# Patient Record
Sex: MALE | Race: BLACK OR AFRICAN AMERICAN | HISPANIC OR LATINO | Employment: FULL TIME | URBAN - METROPOLITAN AREA
[De-identification: names, ages, dates, MRNs, and addresses within clinical notes are randomized per-mention and may not be internally consistent; named-entity substitution may affect disease eponyms.]

---

## 2019-03-16 ENCOUNTER — APPOINTMENT (EMERGENCY)
Dept: CT IMAGING | Facility: HOSPITAL | Age: 34
End: 2019-03-16
Payer: COMMERCIAL

## 2019-03-16 ENCOUNTER — HOSPITAL ENCOUNTER (OUTPATIENT)
Facility: HOSPITAL | Age: 34
Setting detail: OUTPATIENT SURGERY
Discharge: HOME | End: 2019-03-17
Attending: EMERGENCY MEDICINE | Admitting: SPECIALIST
Payer: COMMERCIAL

## 2019-03-16 DIAGNOSIS — K35.80 APPENDICITIS, ACUTE: Primary | ICD-10-CM

## 2019-03-16 DIAGNOSIS — R11.2 NAUSEA AND VOMITING, INTRACTABILITY OF VOMITING NOT SPECIFIED, UNSPECIFIED VOMITING TYPE: ICD-10-CM

## 2019-03-16 DIAGNOSIS — R10.31 RIGHT LOWER QUADRANT ABDOMINAL PAIN: ICD-10-CM

## 2019-03-16 DIAGNOSIS — R10.32 LEFT LOWER QUADRANT PAIN: ICD-10-CM

## 2019-03-16 LAB
ALBUMIN SERPL BCP-MCNC: 4.9 G/DL (ref 3–5.2)
ALP SERPL-CCNC: 117 U/L (ref 43–122)
ALT SERPL W P-5'-P-CCNC: 66 U/L (ref 9–52)
ANION GAP SERPL CALCULATED.3IONS-SCNC: 12 MMOL/L (ref 5–14)
AST SERPL W P-5'-P-CCNC: 49 U/L (ref 17–59)
BACTERIA UR QL AUTO: ABNORMAL /HPF
BASOPHILS # BLD AUTO: 0 THOUSANDS/ΜL (ref 0–0.1)
BASOPHILS NFR BLD AUTO: 0 % (ref 0–1)
BILIRUB SERPL-MCNC: 0.8 MG/DL
BILIRUB UR QL STRIP: ABNORMAL
BUN SERPL-MCNC: 15 MG/DL (ref 5–25)
CALCIUM SERPL-MCNC: 10.1 MG/DL (ref 8.4–10.2)
CHLORIDE SERPL-SCNC: 101 MMOL/L (ref 97–108)
CLARITY UR: CLEAR
CO2 SERPL-SCNC: 25 MMOL/L (ref 22–30)
COLOR UR: ABNORMAL
CREAT SERPL-MCNC: 0.89 MG/DL (ref 0.7–1.5)
EOSINOPHIL # BLD AUTO: 0 THOUSAND/ΜL (ref 0–0.4)
EOSINOPHIL NFR BLD AUTO: 0 % (ref 0–6)
ERYTHROCYTE [DISTWIDTH] IN BLOOD BY AUTOMATED COUNT: 14.3 %
FLUAV + FLUBV RNA ISLT NAA+PROBE: NOT DETECTED
FLUAV + FLUBV RNA ISLT NAA+PROBE: NOT DETECTED
GFR SERPL CREATININE-BSD FRML MDRD: 131 ML/MIN/1.73SQ M
GLUCOSE SERPL-MCNC: 85 MG/DL (ref 70–99)
GLUCOSE UR STRIP-MCNC: NEGATIVE MG/DL
HCT VFR BLD AUTO: 57.4 % (ref 41–53)
HGB BLD-MCNC: 19 G/DL (ref 13.5–17.5)
HGB UR QL STRIP.AUTO: 50
KETONES UR STRIP-MCNC: NEGATIVE MG/DL
LEUKOCYTE ESTERASE UR QL STRIP: NEGATIVE
LIPASE SERPL-CCNC: 131 U/L (ref 23–300)
LYMPHOCYTES # BLD AUTO: 0.5 THOUSANDS/ΜL (ref 0.5–4)
LYMPHOCYTES NFR BLD AUTO: 5 % (ref 25–45)
MCH RBC QN AUTO: 29.7 PG (ref 26–34)
MCHC RBC AUTO-ENTMCNC: 33.2 G/DL (ref 31–36)
MCV RBC AUTO: 90 FL (ref 80–100)
MONOCYTES # BLD AUTO: 0.5 THOUSAND/ΜL (ref 0.2–0.9)
MONOCYTES NFR BLD AUTO: 5 % (ref 1–10)
MUCOUS THREADS UR QL AUTO: ABNORMAL
NEUTROPHILS # BLD AUTO: 8 THOUSANDS/ΜL (ref 1.8–7.8)
NEUTS SEG NFR BLD AUTO: 89 % (ref 45–65)
NITRITE UR QL STRIP: NEGATIVE
NON-SQ EPI CELLS URNS QL MICRO: ABNORMAL /HPF
PH UR STRIP.AUTO: 5 [PH]
PLATELET # BLD AUTO: 328 THOUSANDS/UL (ref 150–450)
PMV BLD AUTO: 8.1 FL (ref 8.9–12.7)
POTASSIUM SERPL-SCNC: 4.2 MMOL/L (ref 3.6–5)
PROT SERPL-MCNC: 8.4 G/DL (ref 5.9–8.4)
PROT UR STRIP-MCNC: NEGATIVE MG/DL
RBC # BLD AUTO: 6.41 MILLION/UL (ref 4.5–5.9)
RBC #/AREA URNS AUTO: ABNORMAL /HPF
SODIUM SERPL-SCNC: 138 MMOL/L (ref 137–147)
SP GR UR STRIP.AUTO: 1.03 (ref 1–1.04)
UROBILINOGEN UA: NEGATIVE MG/DL
WBC # BLD AUTO: 8.9 THOUSAND/UL (ref 4.5–11)
WBC #/AREA URNS AUTO: ABNORMAL /HPF

## 2019-03-16 PROCEDURE — 99285 EMERGENCY DEPT VISIT HI MDM: CPT

## 2019-03-16 PROCEDURE — 96375 TX/PRO/DX INJ NEW DRUG ADDON: CPT

## 2019-03-16 PROCEDURE — 96361 HYDRATE IV INFUSION ADD-ON: CPT

## 2019-03-16 PROCEDURE — 96365 THER/PROPH/DIAG IV INF INIT: CPT

## 2019-03-16 PROCEDURE — 87502 INFLUENZA DNA AMP PROBE: CPT | Performed by: PHYSICIAN ASSISTANT

## 2019-03-16 PROCEDURE — 74177 CT ABD & PELVIS W/CONTRAST: CPT

## 2019-03-16 PROCEDURE — 81001 URINALYSIS AUTO W/SCOPE: CPT | Performed by: PHYSICIAN ASSISTANT

## 2019-03-16 PROCEDURE — 81003 URINALYSIS AUTO W/O SCOPE: CPT | Performed by: PHYSICIAN ASSISTANT

## 2019-03-16 PROCEDURE — 80053 COMPREHEN METABOLIC PANEL: CPT | Performed by: PHYSICIAN ASSISTANT

## 2019-03-16 PROCEDURE — 83690 ASSAY OF LIPASE: CPT | Performed by: PHYSICIAN ASSISTANT

## 2019-03-16 PROCEDURE — 85025 COMPLETE CBC W/AUTO DIFF WBC: CPT | Performed by: PHYSICIAN ASSISTANT

## 2019-03-16 PROCEDURE — 96367 TX/PROPH/DG ADDL SEQ IV INF: CPT

## 2019-03-16 PROCEDURE — 36415 COLL VENOUS BLD VENIPUNCTURE: CPT | Performed by: PHYSICIAN ASSISTANT

## 2019-03-16 RX ORDER — ONDANSETRON 4 MG/1
4 TABLET, ORALLY DISINTEGRATING ORAL ONCE
Status: COMPLETED | OUTPATIENT
Start: 2019-03-16 | End: 2019-03-16

## 2019-03-16 RX ORDER — EMTRICITABINE AND TENOFOVIR DISOPROXIL FUMARATE 200; 300 MG/1; MG/1
1 TABLET, FILM COATED ORAL DAILY
COMMUNITY

## 2019-03-16 RX ORDER — IBUPROFEN 600 MG/1
600 TABLET ORAL ONCE
Status: COMPLETED | OUTPATIENT
Start: 2019-03-16 | End: 2019-03-16

## 2019-03-16 RX ORDER — LEVOFLOXACIN 5 MG/ML
750 INJECTION, SOLUTION INTRAVENOUS ONCE
Status: COMPLETED | OUTPATIENT
Start: 2019-03-16 | End: 2019-03-17

## 2019-03-16 RX ORDER — ONDANSETRON 2 MG/ML
4 INJECTION INTRAMUSCULAR; INTRAVENOUS ONCE
Status: COMPLETED | OUTPATIENT
Start: 2019-03-16 | End: 2019-03-16

## 2019-03-16 RX ORDER — DICYCLOMINE HCL 20 MG
20 TABLET ORAL ONCE
Status: COMPLETED | OUTPATIENT
Start: 2019-03-16 | End: 2019-03-16

## 2019-03-16 RX ADMIN — SODIUM CHLORIDE 1000 ML: 9 INJECTION, SOLUTION INTRAVENOUS at 19:35

## 2019-03-16 RX ADMIN — IOHEXOL 50 ML: 240 INJECTION, SOLUTION INTRATHECAL; INTRAVASCULAR; INTRAVENOUS; ORAL at 21:44

## 2019-03-16 RX ADMIN — METRONIDAZOLE 500 MG: 500 INJECTION, SOLUTION INTRAVENOUS at 21:04

## 2019-03-16 RX ADMIN — IOHEXOL 100 ML: 350 INJECTION, SOLUTION INTRAVENOUS at 23:37

## 2019-03-16 RX ADMIN — ONDANSETRON 4 MG: 4 TABLET, ORALLY DISINTEGRATING ORAL at 18:31

## 2019-03-16 RX ADMIN — DICYCLOMINE HYDROCHLORIDE 20 MG: 20 TABLET ORAL at 18:48

## 2019-03-16 RX ADMIN — ONDANSETRON 4 MG: 2 INJECTION, SOLUTION INTRAMUSCULAR; INTRAVENOUS at 19:38

## 2019-03-16 RX ADMIN — LEVOFLOXACIN 750 MG: 750 INJECTION, SOLUTION INTRAVENOUS at 23:07

## 2019-03-16 RX ADMIN — IBUPROFEN 600 MG: 600 TABLET ORAL at 18:48

## 2019-03-16 RX ADMIN — SODIUM CHLORIDE 1000 ML: 9 INJECTION, SOLUTION INTRAVENOUS at 21:06

## 2019-03-17 ENCOUNTER — ANESTHESIA EVENT (OUTPATIENT)
Dept: MEDSURG UNIT | Facility: HOSPITAL | Age: 34
End: 2019-03-17

## 2019-03-17 ENCOUNTER — ANESTHESIA (INPATIENT)
Dept: PERIOP | Facility: HOSPITAL | Age: 34
End: 2019-03-17
Payer: COMMERCIAL

## 2019-03-17 ENCOUNTER — ANESTHESIA EVENT (INPATIENT)
Dept: PERIOP | Facility: HOSPITAL | Age: 34
End: 2019-03-17
Payer: COMMERCIAL

## 2019-03-17 ENCOUNTER — ANESTHESIA (OUTPATIENT)
Dept: MEDSURG UNIT | Facility: HOSPITAL | Age: 34
End: 2019-03-17

## 2019-03-17 VITALS
OXYGEN SATURATION: 95 % | HEART RATE: 99 BPM | BODY MASS INDEX: 36.14 KG/M2 | RESPIRATION RATE: 18 BRPM | TEMPERATURE: 98.4 F | HEIGHT: 66 IN | DIASTOLIC BLOOD PRESSURE: 77 MMHG | WEIGHT: 224.87 LBS | SYSTOLIC BLOOD PRESSURE: 148 MMHG

## 2019-03-17 PROBLEM — K35.80 APPENDICITIS, ACUTE: Status: ACTIVE | Noted: 2019-03-16

## 2019-03-17 PROCEDURE — 88304 TISSUE EXAM BY PATHOLOGIST: CPT | Performed by: PATHOLOGY

## 2019-03-17 PROCEDURE — 99219 PR INITIAL OBSERVATION CARE/DAY 50 MINUTES: CPT | Performed by: SPECIALIST

## 2019-03-17 PROCEDURE — 44970 LAPAROSCOPY APPENDECTOMY: CPT | Performed by: SPECIALIST

## 2019-03-17 RX ORDER — PROPOFOL 10 MG/ML
INJECTION, EMULSION INTRAVENOUS AS NEEDED
Status: DISCONTINUED | OUTPATIENT
Start: 2019-03-17 | End: 2019-03-17 | Stop reason: SURG

## 2019-03-17 RX ORDER — HEPARIN SODIUM 5000 [USP'U]/ML
5000 INJECTION, SOLUTION INTRAVENOUS; SUBCUTANEOUS EVERY 8 HOURS SCHEDULED
Status: DISCONTINUED | OUTPATIENT
Start: 2019-03-17 | End: 2019-03-17 | Stop reason: HOSPADM

## 2019-03-17 RX ORDER — SODIUM CHLORIDE, SODIUM LACTATE, POTASSIUM CHLORIDE, CALCIUM CHLORIDE 600; 310; 30; 20 MG/100ML; MG/100ML; MG/100ML; MG/100ML
75 INJECTION, SOLUTION INTRAVENOUS CONTINUOUS
Status: DISCONTINUED | OUTPATIENT
Start: 2019-03-17 | End: 2019-03-17 | Stop reason: HOSPADM

## 2019-03-17 RX ORDER — FENTANYL CITRATE 50 UG/ML
INJECTION, SOLUTION INTRAMUSCULAR; INTRAVENOUS AS NEEDED
Status: DISCONTINUED | OUTPATIENT
Start: 2019-03-17 | End: 2019-03-17 | Stop reason: SURG

## 2019-03-17 RX ORDER — ACETAMINOPHEN 325 MG/1
975 TABLET ORAL ONCE
Status: COMPLETED | OUTPATIENT
Start: 2019-03-17 | End: 2019-03-17

## 2019-03-17 RX ORDER — OXYCODONE HYDROCHLORIDE AND ACETAMINOPHEN 5; 325 MG/1; MG/1
1 TABLET ORAL EVERY 4 HOURS PRN
Status: DISCONTINUED | OUTPATIENT
Start: 2019-03-17 | End: 2019-03-17 | Stop reason: HOSPADM

## 2019-03-17 RX ORDER — KETOROLAC TROMETHAMINE 30 MG/ML
INJECTION, SOLUTION INTRAMUSCULAR; INTRAVENOUS AS NEEDED
Status: DISCONTINUED | OUTPATIENT
Start: 2019-03-17 | End: 2019-03-17 | Stop reason: SURG

## 2019-03-17 RX ORDER — LIDOCAINE HYDROCHLORIDE 10 MG/ML
INJECTION, SOLUTION INFILTRATION; PERINEURAL AS NEEDED
Status: DISCONTINUED | OUTPATIENT
Start: 2019-03-17 | End: 2019-03-17 | Stop reason: SURG

## 2019-03-17 RX ORDER — HEPARIN SODIUM 5000 [USP'U]/ML
INJECTION, SOLUTION INTRAVENOUS; SUBCUTANEOUS AS NEEDED
Status: DISCONTINUED | OUTPATIENT
Start: 2019-03-17 | End: 2019-03-17 | Stop reason: SURG

## 2019-03-17 RX ORDER — OXYCODONE HYDROCHLORIDE AND ACETAMINOPHEN 5; 325 MG/1; MG/1
2 TABLET ORAL EVERY 4 HOURS PRN
Status: DISCONTINUED | OUTPATIENT
Start: 2019-03-17 | End: 2019-03-17 | Stop reason: HOSPADM

## 2019-03-17 RX ORDER — SODIUM CHLORIDE, SODIUM LACTATE, POTASSIUM CHLORIDE, CALCIUM CHLORIDE 600; 310; 30; 20 MG/100ML; MG/100ML; MG/100ML; MG/100ML
INJECTION, SOLUTION INTRAVENOUS CONTINUOUS PRN
Status: DISCONTINUED | OUTPATIENT
Start: 2019-03-17 | End: 2019-03-17 | Stop reason: SURG

## 2019-03-17 RX ORDER — SODIUM CHLORIDE 9 MG/ML
125 INJECTION, SOLUTION INTRAVENOUS CONTINUOUS
Status: DISCONTINUED | OUTPATIENT
Start: 2019-03-17 | End: 2019-03-17 | Stop reason: HOSPADM

## 2019-03-17 RX ORDER — HYDROMORPHONE HCL/PF 1 MG/ML
0.2 SYRINGE (ML) INJECTION
Status: DISCONTINUED | OUTPATIENT
Start: 2019-03-17 | End: 2019-03-17 | Stop reason: HOSPADM

## 2019-03-17 RX ORDER — CEFAZOLIN SODIUM 2 G/50ML
SOLUTION INTRAVENOUS AS NEEDED
Status: DISCONTINUED | OUTPATIENT
Start: 2019-03-17 | End: 2019-03-17 | Stop reason: SURG

## 2019-03-17 RX ORDER — OXYCODONE HYDROCHLORIDE AND ACETAMINOPHEN 5; 325 MG/1; MG/1
1 TABLET ORAL EVERY 4 HOURS PRN
Qty: 20 TABLET | Refills: 0 | Status: SHIPPED | OUTPATIENT
Start: 2019-03-17 | End: 2019-03-27

## 2019-03-17 RX ORDER — ONDANSETRON 2 MG/ML
4 INJECTION INTRAMUSCULAR; INTRAVENOUS ONCE AS NEEDED
Status: DISCONTINUED | OUTPATIENT
Start: 2019-03-17 | End: 2019-03-17 | Stop reason: HOSPADM

## 2019-03-17 RX ORDER — MIDAZOLAM HYDROCHLORIDE 1 MG/ML
INJECTION INTRAMUSCULAR; INTRAVENOUS AS NEEDED
Status: DISCONTINUED | OUTPATIENT
Start: 2019-03-17 | End: 2019-03-17 | Stop reason: SURG

## 2019-03-17 RX ORDER — DEXAMETHASONE SODIUM PHOSPHATE 4 MG/ML
INJECTION, SOLUTION INTRA-ARTICULAR; INTRALESIONAL; INTRAMUSCULAR; INTRAVENOUS; SOFT TISSUE AS NEEDED
Status: DISCONTINUED | OUTPATIENT
Start: 2019-03-17 | End: 2019-03-17 | Stop reason: SURG

## 2019-03-17 RX ORDER — MORPHINE SULFATE 10 MG/ML
10 INJECTION, SOLUTION INTRAMUSCULAR; INTRAVENOUS ONCE
Status: DISCONTINUED | OUTPATIENT
Start: 2019-03-17 | End: 2019-03-17 | Stop reason: HOSPADM

## 2019-03-17 RX ORDER — BUPIVACAINE HYDROCHLORIDE 5 MG/ML
INJECTION, SOLUTION PERINEURAL AS NEEDED
Status: DISCONTINUED | OUTPATIENT
Start: 2019-03-17 | End: 2019-03-17 | Stop reason: HOSPADM

## 2019-03-17 RX ORDER — ROCURONIUM BROMIDE 10 MG/ML
INJECTION, SOLUTION INTRAVENOUS AS NEEDED
Status: DISCONTINUED | OUTPATIENT
Start: 2019-03-17 | End: 2019-03-17 | Stop reason: SURG

## 2019-03-17 RX ORDER — DIPHENHYDRAMINE HYDROCHLORIDE 50 MG/ML
50 INJECTION INTRAMUSCULAR; INTRAVENOUS ONCE
Status: DISCONTINUED | OUTPATIENT
Start: 2019-03-17 | End: 2019-03-17 | Stop reason: HOSPADM

## 2019-03-17 RX ORDER — SODIUM CHLORIDE 9 MG/ML
INJECTION, SOLUTION INTRAVENOUS AS NEEDED
Status: DISCONTINUED | OUTPATIENT
Start: 2019-03-17 | End: 2019-03-17 | Stop reason: HOSPADM

## 2019-03-17 RX ORDER — ONDANSETRON 2 MG/ML
INJECTION INTRAMUSCULAR; INTRAVENOUS AS NEEDED
Status: DISCONTINUED | OUTPATIENT
Start: 2019-03-17 | End: 2019-03-17 | Stop reason: SURG

## 2019-03-17 RX ORDER — ONDANSETRON 2 MG/ML
4 INJECTION INTRAMUSCULAR; INTRAVENOUS EVERY 8 HOURS PRN
Status: DISCONTINUED | OUTPATIENT
Start: 2019-03-17 | End: 2019-03-17 | Stop reason: HOSPADM

## 2019-03-17 RX ADMIN — LIDOCAINE HYDROCHLORIDE 60 MG: 10 INJECTION, SOLUTION INFILTRATION; PERINEURAL at 10:34

## 2019-03-17 RX ADMIN — SODIUM CHLORIDE, SODIUM LACTATE, POTASSIUM CHLORIDE, AND CALCIUM CHLORIDE: .6; .31; .03; .02 INJECTION, SOLUTION INTRAVENOUS at 10:29

## 2019-03-17 RX ADMIN — ONDANSETRON HYDROCHLORIDE 4 MG: 2 INJECTION, SOLUTION INTRAMUSCULAR; INTRAVENOUS at 11:27

## 2019-03-17 RX ADMIN — ROCURONIUM BROMIDE 40 MG: 10 INJECTION INTRAVENOUS at 10:34

## 2019-03-17 RX ADMIN — KETOROLAC TROMETHAMINE 30 MG: 30 INJECTION, SOLUTION INTRAMUSCULAR; INTRAVENOUS at 11:48

## 2019-03-17 RX ADMIN — DEXAMETHASONE SODIUM PHOSPHATE 4 MG: 4 INJECTION, SOLUTION INTRA-ARTICULAR; INTRALESIONAL; INTRAMUSCULAR; INTRAVENOUS; SOFT TISSUE at 11:25

## 2019-03-17 RX ADMIN — HYDROMORPHONE HYDROCHLORIDE 0.2 MG: 1 INJECTION, SOLUTION INTRAMUSCULAR; INTRAVENOUS; SUBCUTANEOUS at 12:38

## 2019-03-17 RX ADMIN — HEPARIN SODIUM 5000 UNITS: 5000 INJECTION INTRAVENOUS; SUBCUTANEOUS at 14:00

## 2019-03-17 RX ADMIN — SUGAMMADEX 200 MG: 100 INJECTION, SOLUTION INTRAVENOUS at 12:01

## 2019-03-17 RX ADMIN — CEFAZOLIN SODIUM 2000 MG: 2 SOLUTION INTRAVENOUS at 10:45

## 2019-03-17 RX ADMIN — HEPARIN SODIUM 5000 UNITS: 5000 INJECTION, SOLUTION INTRAVENOUS; SUBCUTANEOUS at 10:44

## 2019-03-17 RX ADMIN — ROCURONIUM BROMIDE 10 MG: 10 INJECTION INTRAVENOUS at 11:08

## 2019-03-17 RX ADMIN — SODIUM CHLORIDE 125 ML/HR: 9 INJECTION, SOLUTION INTRAVENOUS at 02:40

## 2019-03-17 RX ADMIN — MIDAZOLAM HYDROCHLORIDE 2 MG: 1 INJECTION, SOLUTION INTRAMUSCULAR; INTRAVENOUS at 10:30

## 2019-03-17 RX ADMIN — OXYCODONE HYDROCHLORIDE AND ACETAMINOPHEN 2 TABLET: 5; 325 TABLET ORAL at 14:59

## 2019-03-17 RX ADMIN — FENTANYL CITRATE 100 MCG: 50 INJECTION INTRAMUSCULAR; INTRAVENOUS at 11:17

## 2019-03-17 RX ADMIN — METRONIDAZOLE 500 MG: 500 INJECTION, SOLUTION INTRAVENOUS at 10:51

## 2019-03-17 RX ADMIN — SODIUM CHLORIDE 1000 ML: 9 INJECTION, SOLUTION INTRAVENOUS at 00:44

## 2019-03-17 RX ADMIN — PROPOFOL 200 MG: 10 INJECTION, EMULSION INTRAVENOUS at 10:34

## 2019-03-17 RX ADMIN — HYDROMORPHONE HYDROCHLORIDE 0.2 MG: 1 INJECTION, SOLUTION INTRAMUSCULAR; INTRAVENOUS; SUBCUTANEOUS at 12:44

## 2019-03-17 RX ADMIN — FENTANYL CITRATE 100 MCG: 50 INJECTION INTRAMUSCULAR; INTRAVENOUS at 10:46

## 2019-03-17 RX ADMIN — ACETAMINOPHEN 975 MG: 325 TABLET ORAL at 00:44

## 2019-03-17 NOTE — ED PROVIDER NOTES
History  Chief Complaint   Patient presents with    Vomiting     "I have a really bad headache, I was vomiting and having diarrhea and my stomach hurts"   started at 10 this morning     Patient's vomiting fevers chills nausea and headaches          Prior to Admission Medications   Prescriptions Last Dose Informant Patient Reported? Taking?   emtricitabine-tenofovir (TRUVADA) 200-300 mg per tablet   Yes Yes   Sig: Take 1 tablet by mouth daily      Facility-Administered Medications: None       History reviewed  No pertinent past medical history  History reviewed  No pertinent surgical history  History reviewed  No pertinent family history  I have reviewed and agree with the history as documented  Social History     Tobacco Use    Smoking status: Never Smoker    Smokeless tobacco: Never Used   Substance Use Topics    Alcohol use: Not Currently     Frequency: Never    Drug use: Never        Review of Systems   Constitutional: Positive for activity change, appetite change, chills and fever  HENT: Negative  Eyes: Negative  Respiratory: Negative  Negative for apnea, choking and chest tightness  Cardiovascular: Negative  Negative for chest pain, palpitations and leg swelling  Gastrointestinal: Positive for abdominal distention, abdominal pain, diarrhea, nausea and vomiting  Endocrine: Negative  Genitourinary: Negative  Musculoskeletal: Negative  Skin: Negative  Negative for color change and pallor  Allergic/Immunologic: Negative  Neurological: Negative for dizziness and headaches  Hematological: Negative  Psychiatric/Behavioral: Negative  Negative for behavioral problems and confusion  All other systems reviewed and are negative  Physical Exam  Physical Exam   Constitutional: He is oriented to person, place, and time  He appears well-developed and well-nourished  Moderate distress   HENT:   Head: Normocephalic and atraumatic     Eyes: Pupils are equal, round, and reactive to light  Neck: Normal range of motion  Neck supple  Cardiovascular: Normal rate and regular rhythm  Pulmonary/Chest: Effort normal and breath sounds normal    Abdominal: Soft  He exhibits distension  There is tenderness  There is rebound and guarding  Musculoskeletal: Normal range of motion  He exhibits no edema or deformity  Neurological: He is alert and oriented to person, place, and time  Skin: Skin is warm and dry  Capillary refill takes less than 2 seconds  Psychiatric: He has a normal mood and affect  His behavior is normal  Judgment and thought content normal    Nursing note and vitals reviewed        Vital Signs  ED Triage Vitals   Temperature Pulse Respirations Blood Pressure SpO2   03/16/19 1747 03/16/19 1747 03/16/19 1747 03/16/19 1747 03/16/19 1747   (!) 100 6 °F (38 1 °C) (!) 123 16 (!) 174/81 100 %      Temp Source Heart Rate Source Patient Position - Orthostatic VS BP Location FiO2 (%)   03/16/19 1747 03/16/19 1747 03/16/19 1747 03/16/19 1747 --   Tympanic Monitor Sitting Left arm       Pain Score       03/16/19 1848       Worst Possible Pain           Vitals:    03/16/19 2205 03/16/19 2301 03/16/19 2310 03/16/19 2340   BP: 169/89 149/75 152/89 156/88   Pulse: (!) 118 (!) 123 (!) 112 (!) 123   Patient Position - Orthostatic VS: Lying Lying Lying Lying       qSOFA     Row Name 03/16/19 2340 03/16/19 2310 03/16/19 2301 03/16/19 2205 03/16/19 2040    Altered mental status GCS < 15  --  --  --  --  --    Respiratory Rate > / =22  0  0  0  0  0    Systolic BP < / =993  0  0  0  0  0    Q Sofa Score  0  0  0  0  0    Row Name 03/16/19 1910 03/16/19 1747             Altered mental status GCS < 15  0  --       Respiratory Rate > / =22  0  0       Systolic BP < / =174  0  0       Q Sofa Score  0  0             Visual Acuity      ED Medications  Medications   ondansetron (ZOFRAN) injection 4 mg (has no administration in time range)   morphine (PF) 10 mg/mL injection 10 mg (has no administration in time range)   diphenhydrAMINE (BENADRYL) injection 50 mg (has no administration in time range)   sodium chloride 0 9 % bolus 1,000 mL (1,000 mL Intravenous New Bag 3/17/19 0044)   ondansetron (ZOFRAN-ODT) dispersible tablet 4 mg (4 mg Oral Given 3/16/19 1831)   dicyclomine (BENTYL) tablet 20 mg (20 mg Oral Given 3/16/19 1848)   ibuprofen (MOTRIN) tablet 600 mg (600 mg Oral Given 3/16/19 1848)   ondansetron (ZOFRAN) injection 4 mg (4 mg Intravenous Given 3/16/19 1938)   sodium chloride 0 9 % bolus 1,000 mL (0 mL Intravenous Stopped 3/16/19 2106)   metroNIDAZOLE (FLAGYL) IVPB (premix) 500 mg (0 mg Intravenous Stopped 3/16/19 2144)   sodium chloride 0 9 % bolus 1,000 mL (0 mL Intravenous Stopped 3/16/19 2144)   iohexol (OMNIPAQUE) 240 MG/ML solution 50 mL (50 mL Oral Given 3/16/19 2144)   levofloxacin (LEVAQUIN) IVPB (premix) 750 mg (750 mg Intravenous New Bag 3/16/19 2307)   iohexol (OMNIPAQUE) 350 MG/ML injection (SINGLE-DOSE) 100 mL (100 mL Intravenous Given 3/16/19 2337)   acetaminophen (TYLENOL) tablet 975 mg (975 mg Oral Given 3/17/19 0044)       Diagnostic Studies  Results Reviewed     Procedure Component Value Units Date/Time    Comprehensive metabolic panel [[id number]]  (Abnormal) Collected:  03/16/19 2019    Lab Status:  Final result Specimen:  Blood from Hand, Left Updated:  03/16/19 2036     Sodium 138 mmol/L      Potassium 4 2 mmol/L      Chloride 101 mmol/L      CO2 25 mmol/L      ANION GAP 12 mmol/L      BUN 15 mg/dL      Creatinine 0 89 mg/dL      Glucose 85 mg/dL      Calcium 10 1 mg/dL      AST 49 U/L      ALT 66 U/L      Alkaline Phosphatase 117 U/L      Total Protein 8 4 g/dL      Albumin 4 9 g/dL      Total Bilirubin 0 80 mg/dL      eGFR 131 ml/min/1 73sq m     Narrative:       National Kidney Disease Education Program recommendations are as follows:  GFR calculation is accurate only with a steady state creatinine  Chronic Kidney disease less than 60 ml/min/1 73 sq  meters  Kidney failure less than 15 ml/min/1 73 sq  meters      Lipase [[id number]]  (Normal) Collected:  03/16/19 2019    Lab Status:  Final result Specimen:  Blood from Hand, Left Updated:  03/16/19 2036     Lipase 131 u/L     CBC and differential [[id number]]  (Abnormal) Collected:  03/16/19 1934    Lab Status:  Final result Specimen:  Blood from Hand, Right Updated:  03/16/19 1941     WBC 8 90 Thousand/uL      RBC 6 41 Million/uL      Hemoglobin 19 0 g/dL      Hematocrit 57 4 %      MCV 90 fL      MCH 29 7 pg      MCHC 33 2 g/dL      RDW 14 3 %      MPV 8 1 fL      Platelets 812 Thousands/uL      Neutrophils Relative 89 %      Lymphocytes Relative 5 %      Monocytes Relative 5 %      Eosinophils Relative 0 %      Basophils Relative 0 %      Neutrophils Absolute 8 00 Thousands/µL      Lymphocytes Absolute 0 50 Thousands/µL      Monocytes Absolute 0 50 Thousand/µL      Eosinophils Absolute 0 00 Thousand/µL      Basophils Absolute 0 00 Thousands/µL     Urine Microscopic [[id number]]  (Abnormal) Collected:  03/16/19 1846    Lab Status:  Final result Specimen:  Urine, Other Updated:  03/16/19 1903     RBC, UA 1-2 /hpf      WBC, UA 1-2 /hpf      Epithelial Cells Occasional /hpf      Bacteria, UA Occasional /hpf      MUCUS THREADS Innumerable    UA w Reflex to Microscopic w Reflex to Culture [[id number]]  (Abnormal) Collected:  03/16/19 1846    Lab Status:  Final result Specimen:  Urine, Other Updated:  03/16/19 1856     Color, UA Deb     Clarity, UA Clear     Specific Volin, UA 1 030     pH, UA 5 0     Leukocytes, UA Negative     Nitrite, UA Negative     Protein, UA Negative mg/dl      Glucose, UA Negative mg/dl      Ketones, UA Negative mg/dl      Bilirubin, UA 1 mg/dL     Blood, UA 50 0     UROBILINOGEN UA Negative mg/dL     Rapid Flu-Viral RNA amplification [facility] HEART ONLY) [[id number]]  (Normal) Collected:  03/16/19 1829    Lab Status:  Final result Specimen:  Nares from Nose Updated:  03/16/19 1853     INFLUENZA A AMPLIFIED RNA Not Detected     INFLUENZA B AMPLIFIED Not Detected                 CT abdomen pelvis w contrast   Final Result by Valentin Phelps MD (03/17 0015)      Appendix is fluid-filled with questionable periappendiceal fat stranding and appendiceal diameter at the upper limits of normal measuring 1 1 cm  Findings are concerning for but not diagnostic of acute appendicitis  Fatty liver  No colitis or enteritis               I personally discussed this study with Lorelei Harriett on 3/17/2019 at 12:13 AM                      Workstation performed: [id number]                    Procedures  Procedures       Phone Contacts  ED Phone Contact    ED Course                               MDM  Number of Diagnoses or Management Options  Appendicitis, acute:   Left lower quadrant pain:   Nausea and vomiting, intractability of vomiting not specified, unspecified vomiting type:   Right lower quadrant abdominal pain:       Disposition  Final diagnoses:   Appendicitis, acute   Nausea and vomiting, intractability of vomiting not specified, unspecified vomiting type   Left lower quadrant pain   Right lower quadrant abdominal pain     Time reflects when diagnosis was documented in both MDM as applicable and the Disposition within this note     Time User Action Codes Description Comment    3/17/2019 12:54 AM Edy Miki Add [K35 80] Appendicitis, acute     3/17/2019 12:55 AM Edy Miki Add [R11 2] Nausea and vomiting, intractability of vomiting not specified, unspecified vomiting type     3/17/2019 12:55 AM Edy Houstonia Add [R10 32] Left lower quadrant pain     3/17/2019 12:55 AM Edy Houstonia Add [R10 31] Right lower quadrant abdominal pain       ED Disposition     ED Disposition Condition Date/Time Comment    Admit Alisa Hartmann Mar 17, 2019 12:55 AM Case was discussed with Dr Baljeet Mullins and the patient's admission status was agreed to be Admission Status: inpatient status to the service of Dr Marta Gonzales Information    None         Patient's Medications   Discharge Prescriptions    No medications on file     No discharge procedures on file      ED Provider  Electronically Signed by           Olga Mathew MD  03/17/19 4067       Olga Mathew MD  03/17/19 0693       Olga Mathew MD  03/17/19 1670       Olga Mathew MD  03/17/19 9534

## 2019-03-17 NOTE — ANESTHESIA POSTPROCEDURE EVALUATION
Post-Op Assessment Note    CV Status:  Stable  Pain Score: 2    Pain management: adequate     Mental Status:  Alert   Hydration Status:  Euvolemic   PONV Controlled:  Controlled   Airway Patency:  Patent   Post Op Vitals Reviewed: Yes      Staff: Anesthesiologist           BP   132/73   Temp   98 4   Pulse  112   Resp   18   SpO2   100 %FM

## 2019-03-17 NOTE — H&P
Chief Complaint:  Abdominal pain nausea vomiting      History of Present Illness:  Patient is a 22-year-old  male who presents to the office with a 24 hour history of nausea vomiting and diarrhea  He developed diffuse abdominal pain which was originally periumbilical and became right lower quadrant  Presents to the emergency room at [facility] dehydrated and in pain  He was resuscitated in the CT scan the abdomen was obtained that demonstrated what appeared to be acute appendicitis with a dilated appendix and periappendiceal stranding  He is admitted at this time for definitive treatment  Past Medical History: History reviewed  No pertinent past medical history  Past Surgical History:  History reviewed  No pertinent surgical history  Allergies:  No Known Allergies      Medications:    Current Facility-Administered Medications:     diphenhydrAMINE (BENADRYL) injection 50 mg, 50 mg, Intravenous, Once, Ramon Doherty MD    morphine (PF) 10 mg/mL injection 10 mg, 10 mg, Intravenous, Once, Ramon Dohrety MD    ondansetron [facility]) injection 4 mg, 4 mg, Intravenous, Once PRN, Ramon Doherty MD    sodium chloride 0 9 % infusion, 125 mL/hr, Intravenous, Continuous, Ramon Doherty MD, Last Rate: 125 mL/hr at 03/17/19 0240, 125 mL/hr at 03/17/19 0240      Social History:  Social History     Social History     Substance and Sexual Activity   Alcohol Use Not Currently    Frequency: Never     Social History     Substance and Sexual Activity   Drug Use Never     Social History     Tobacco Use   Smoking Status Never Smoker   Smokeless Tobacco Never Used         Family History:  History reviewed  No pertinent family history        Review of Systems:    negative he denies weight loss fever chills night sweats chest pain shortness of breath    Vitals:  Vitals:    03/17/19 0725   BP: 123/74   Pulse: 101   Resp: 18   Temp: 98 2 °F (36 8 °C)   SpO2: 98%       Physical Exam:  Patient is a young adult  male obese awake alert in minimal distress  Vital signs as above  Skin warm dry  Head normocephalic and atraumatic  Eyes LUCINDA a m  Intact  Ears and nose within normal limits  Throat gag reflex intact  Neck no masses thyromegaly  Back no CVA or spinal tenderness  Lungs clear to a and P  Cor regular rate and rhythm  Heart rate 101  No murmurs carotid bruits  Abdomen protuberant soft mild right-sided abdominal tenderness  No masses guarding or rebound noted  Extremities negative CC E  Neurologically A&O x3 cranial nerves 2-12 intact      Lab Results: I have personally reviewed pertinent reports  See below  Imaging: I have personally reviewed pertinent reports  EKG, Pathology, and Other Studies: I have personally reviewed pertinent reports       Admission on 03/16/2019   Component Date Value    INFLUENZA A AMPLIFIED RNA 03/16/2019 Not Detected     INFLUENZA B AMPLIFIED 03/16/2019 Not Detected     Color, UA 03/16/2019 Deb*    Clarity, UA 03/16/2019 Clear     Specific Gravity, UA 03/16/2019 1 030     pH, UA 03/16/2019 5 0     Leukocytes, UA 03/16/2019 Negative     Nitrite, UA 03/16/2019 Negative     Protein, UA 03/16/2019 Negative     Glucose, UA 03/16/2019 Negative     Ketones, UA 03/16/2019 Negative     Bilirubin, UA 03/16/2019 1 mg/dL*    Blood, UA 03/16/2019 50 0*    UROBILINOGEN UA 03/16/2019 Negative     RBC, UA 03/16/2019 1-2*    WBC, UA 03/16/2019 1-2*    Epithelial Cells 03/16/2019 Occasional     Bacteria, UA 03/16/2019 Occasional     MUCUS THREADS 03/16/2019 Innumerable*    WBC 03/16/2019 8 90     RBC 03/16/2019 6 41*    Hemoglobin 03/16/2019 19 0*    Hematocrit 03/16/2019 57 4*    MCV 03/16/2019 90     MCH 03/16/2019 29 7     MCHC 03/16/2019 33 2     RDW 03/16/2019 14 3     MPV 03/16/2019 8 1*    Platelets 99/15/8668 328     Neutrophils Relative 03/16/2019 89*    Lymphocytes Relative 03/16/2019 5*    Monocytes Relative 03/16/2019 5     Eosinophils Relative 03/16/2019 0     Basophils Relative 03/16/2019 0     Neutrophils Absolute 03/16/2019 8 00*    Lymphocytes Absolute 03/16/2019 0 50     Monocytes Absolute 03/16/2019 0 50     Eosinophils Absolute 03/16/2019 0 00     Basophils Absolute 03/16/2019 0 00     Sodium 03/16/2019 138     Potassium 03/16/2019 4 2     Chloride 03/16/2019 101     CO2 03/16/2019 25     ANION GAP 03/16/2019 12     BUN 03/16/2019 15     Creatinine 03/16/2019 0 89     Glucose 03/16/2019 85     Calcium 03/16/2019 10 1     AST 03/16/2019 49     ALT 03/16/2019 66*    Alkaline Phosphatase 03/16/2019 117     Total Protein 03/16/2019 8 4     Albumin 03/16/2019 4 9     Total Bilirubin 03/16/2019 0 80     eGFR 03/16/2019 131     Lipase 03/16/2019 131          Impression:  Acute appendicitis on CT scan  Dehydration    Plan:  Resuscitated  Antibiotics    Laparoscopic appendectomy

## 2019-03-17 NOTE — NURSING NOTE
Patient awake, alert and oriented to PPT  States 3/10 right quadrant pain  Denies N/V currently  States continued diarrhea  Remains NPO  Instructed patient on preop bath  IVF infusing as ordered  Bed in lowest position, call bell within reach

## 2019-03-17 NOTE — ANESTHESIA PREPROCEDURE EVALUATION
Review of Systems/Medical History          Cardiovascular  Negative cardio ROS Exercise tolerance (METS): >4,     Pulmonary  Negative pulmonary ROS        GI/Hepatic  Negative GI/hepatic ROS          Negative  ROS        Endo/Other  Negative endo/other ROS      GYN       Hematology  Negative hematology ROS     Comment: Pt takes preexposure prophylaxis for HI V as he is high risk and has a male sexual partner  Musculoskeletal  Negative musculoskeletal ROS        Neurology  Negative neurology ROS      Psychology   Negative psychology ROS              Physical Exam    Airway    Mallampati score: II  TM Distance: >3 FB  Neck ROM: full     Dental       Cardiovascular  Comment: Negative ROS, Cardiovascular exam normal    Pulmonary  Pulmonary exam normal     Other Findings        Anesthesia Plan  ASA Score- 2     Anesthesia Type- general with ASA Monitors  Additional Monitors:   Airway Plan: ETT  Plan Factors-Patient not instructed to abstain from smoking on day of procedure  Patient did not smoke on day of surgery  Induction- intravenous  Postoperative Plan- Plan for postoperative opioid use  Informed Consent- Anesthetic plan and risks discussed with patient and spouse                No results found for: HGBA1C    Lab Results   Component Value Date    K 4 2 03/16/2019     03/16/2019    CO2 25 03/16/2019    BUN 15 03/16/2019    CREATININE 0 89 03/16/2019    CALCIUM 10 1 03/16/2019    AST 49 03/16/2019    ALT 66 (H) 03/16/2019    ALKPHOS 117 03/16/2019    EGFR 131 03/16/2019       Lab Results   Component Value Date    WBC 8 90 03/16/2019    HGB 19 0 (H) 03/16/2019    HCT 57 4 (H) 03/16/2019    MCV 90 03/16/2019     03/16/2019

## 2019-03-17 NOTE — DISCHARGE INSTRUCTIONS
Appendicitis   WHAT YOU SHOULD KNOW:   Appendicitis is inflammation of the appendix  The appendix is a small pouch that is attached to the large intestine on the lower right side of the abdomen  AFTER YOU LEAVE:   Medicines:   · Pain medicine: You may be given medicine to take away or decrease pain  Do not wait until the pain is severe before you take your medicine  · Antibiotics: This medicine is given to fight or prevent an infection caused by bacteria  Always take your antibiotics exactly as ordered by your healthcare provider  Do not stop taking your medicine unless directed by your healthcare provider  Never save antibiotics or take leftover antibiotics that were given to you for another illness  · Take your medicine as directed  Call your healthcare provider if you think your medicine is not helping or if you have side effects  Tell him if you are allergic to any medicine  Keep a list of the medicines, vitamins, and herbs you take  Include the amounts, and when and why you take them  Bring the list or the pill bottles to follow-up visits  Carry your medicine list with you in case of an emergency  Follow up with your healthcare provider in 2 weeks:  Write down your questions so you remember to ask them during your visits  Activity:  You may need to limit activity for 4 to 6 weeks after surgery  Ask your healthcare provider what activities you can do  Contact your healthcare provider if:   · You have abdominal pain that does not go away, even after you take medicine  · You have chills, a cough, or feel weak and achy  · You have trouble having a bowel movement or have diarrhea  · You have questions or concerns about your condition or care  Seek care immediately or call 911 if:   · You have a fever  · You have severe pain in your abdomen  · You are vomiting and cannot keep food down    © 2014 [employer] is for End User's use only and may not be sold, redistributed or otherwise used for commercial purposes  All illustrations and images included in CareNotes® are the copyrighted property of [employer]  or James Salguero  The above information is an  only  It is not intended as medical advice for individual conditions or treatments  Talk to your doctor, nurse or pharmacist before following any medical regimen to see if it is safe and effective for you

## 2019-03-17 NOTE — ED PROVIDER NOTES
History  Chief Complaint   Patient presents with    Vomiting     "I have a really bad headache, I was vomiting and having diarrhea and my stomach hurts"   started at 10 this morning     Patient is a 72-year-old male who presents today with a chief complaint of nausea, vomiting, diarrhea, fever, headache  Patient reports he began the symptoms earlier today approximately around 10:00 a m  Maile Shipley Patient reports he has had no appetite today and notes he did attempt to eat however had vomiting after eating  Patient reports 2 days ago he did have blood in his stool and notes the family history of ulcerative colitis  Patient reports abdominal pain noted to the lower abdomen and endorses dysuria at this time  History provided by:  Patient   used: No    Vomiting   Severity:  Moderate  Duration:  1 day  Timing:  Constant  Number of daily episodes:  6-8  Quality:  Stomach contents and bilious material (bilious)  Progression:  Unchanged  Chronicity:  New  Recent urination:  Increased  Relieved by:  None tried  Worsened by:  Nothing  Ineffective treatments:  None tried  Associated symptoms: abdominal pain, arthralgias, chills, diarrhea, fever, headaches and myalgias    Associated symptoms: no sore throat    Diarrhea:     Number of occurrences:  "too many to count"    Severity:  Moderate    Timing:  Constant      Prior to Admission Medications   Prescriptions Last Dose Informant Patient Reported? Taking?   emtricitabine-tenofovir (TRUVADA) 200-300 mg per tablet Past Month at Unknown time  Yes Yes   Sig: Take 1 tablet by mouth daily      Facility-Administered Medications: None       History reviewed  No pertinent past medical history  Past Surgical History:   Procedure Laterality Date    NE LAP,APPENDECTOMY N/A 3/17/2019    Procedure: APPENDECTOMY LAPAROSCOPIC;  Surgeon: Rogerio Mcnair MD;  Location: [facility] MAIN OR;  Service: General       History reviewed  No pertinent family history    I have reviewed and agree with the history as documented  Social History     Tobacco Use    Smoking status: Never Smoker    Smokeless tobacco: Never Used   Substance Use Topics    Alcohol use: Not Currently     Frequency: Never    Drug use: Never        Review of Systems   Constitutional: Positive for chills and fever  Negative for fatigue  HENT: Negative for congestion, ear pain, rhinorrhea and sore throat  Eyes: Negative for redness  Respiratory: Negative for chest tightness and shortness of breath  Cardiovascular: Negative for chest pain and palpitations  Gastrointestinal: Positive for abdominal pain, diarrhea and vomiting  Negative for nausea  Genitourinary: Negative for dysuria and hematuria  Musculoskeletal: Positive for arthralgias and myalgias  Skin: Negative for rash  Neurological: Positive for headaches  Negative for dizziness, syncope, light-headedness and numbness         Physical Exam  Physical Exam    Vital Signs  ED Triage Vitals   Temperature Pulse Respirations Blood Pressure SpO2   03/16/19 1747 03/16/19 1747 03/16/19 1747 03/16/19 1747 03/16/19 1747   (!) 100 6 °F (38 1 °C) (!) 123 16 (!) 174/81 100 %      Temp Source Heart Rate Source Patient Position - Orthostatic VS BP Location FiO2 (%)   03/16/19 1747 03/16/19 1747 03/16/19 1747 03/16/19 1747 --   Tympanic Monitor Sitting Left arm       Pain Score       03/16/19 1848       Worst Possible Pain           Vitals:    03/17/19 1245 03/17/19 1256 03/17/19 1300 03/17/19 1523   BP: 123/64 124/74 121/72 148/77   Pulse: 104 99 98 99   Patient Position - Orthostatic VS:    Sitting       qSOFA     Row Name 03/17/19 1523 03/17/19 1300 03/17/19 1256 03/17/19 1245 03/17/19 1230    Altered mental status GCS < 15  --  --  --  --  --    Respiratory Rate > / =22  0  0  0  0  0    Systolic BP < / =038  0  0  0  0  0    Q Sofa Score  0  0  0  0  0    Row Name 03/17/19 1215 03/17/19 0725 03/17/19 0227 03/17/19 0208 03/17/19 0145    Altered mental status GCS < 15  --  --  --  --  --    Respiratory Rate > / =22  0  0  0  0  0    Systolic BP < / =217  0  0  0  0  0    Q Sofa Score  0  0  0  0  0    Row Name 03/17/19 0130 03/17/19 0115 03/17/19 0100 03/16/19 2340 03/16/19 2310    Altered mental status GCS < 15  --  --  --  --  --    Respiratory Rate > / =22  0  0  0  0  0    Systolic BP < / =718  0  0  0  0  0    Q Sofa Score  0  0  0  0  0    Row Name 03/16/19 2301 03/16/19 2205 03/16/19 2040 03/16/19 1910 03/16/19 1747    Altered mental status GCS < 15  --  --  --  0  --    Respiratory Rate > / =22  0  0  0  0  0    Systolic BP < / =628  0  0  0  0  0    Q Sofa Score  0  0  0  0  0          Visual Acuity      ED Medications  Medications   ondansetron (ZOFRAN-ODT) dispersible tablet 4 mg (4 mg Oral Given 3/16/19 1831)   dicyclomine (BENTYL) tablet 20 mg (20 mg Oral Given 3/16/19 1848)   ibuprofen (MOTRIN) tablet 600 mg (600 mg Oral Given 3/16/19 1848)   ondansetron (ZOFRAN) injection 4 mg (4 mg Intravenous Given 3/16/19 1938)   sodium chloride 0 9 % bolus 1,000 mL (0 mL Intravenous Stopped 3/16/19 2106)   metroNIDAZOLE (FLAGYL) IVPB (premix) 500 mg (0 mg Intravenous Stopped 3/16/19 2144)   sodium chloride 0 9 % bolus 1,000 mL (0 mL Intravenous Stopped 3/16/19 2144)   iohexol (OMNIPAQUE) 240 MG/ML solution 50 mL (50 mL Oral Given 3/16/19 2144)   levofloxacin (LEVAQUIN) IVPB (premix) 750 mg (0 mg Intravenous Stopped 3/17/19 0102)   iohexol (OMNIPAQUE) 350 MG/ML injection (SINGLE-DOSE) 100 mL (100 mL Intravenous Given 3/16/19 2337)   sodium chloride 0 9 % bolus 1,000 mL (0 mL Intravenous Stopped 3/17/19 0153)   acetaminophen (TYLENOL) tablet 975 mg (975 mg Oral Given 3/17/19 0044)       Diagnostic Studies  Results Reviewed     Procedure Component Value Units Date/Time    Comprehensive metabolic panel [[id number]]  (Abnormal) Collected:  03/16/19 2019    Lab Status:  Final result Specimen:  Blood from Hand, Left Updated:  03/16/19 2036     Sodium 138 mmol/L      Potassium 4 2 mmol/L      Chloride 101 mmol/L      CO2 25 mmol/L      ANION GAP 12 mmol/L      BUN 15 mg/dL      Creatinine 0 89 mg/dL      Glucose 85 mg/dL      Calcium 10 1 mg/dL      AST 49 U/L      ALT 66 U/L      Alkaline Phosphatase 117 U/L      Total Protein 8 4 g/dL      Albumin 4 9 g/dL      Total Bilirubin 0 80 mg/dL      eGFR 131 ml/min/1 73sq m     Narrative:       National Kidney Disease Education Program recommendations are as follows:  GFR calculation is accurate only with a steady state creatinine  Chronic Kidney disease less than 60 ml/min/1 73 sq  meters  Kidney failure less than 15 ml/min/1 73 sq  meters      Lipase [[id number]]  (Normal) Collected:  03/16/19 2019    Lab Status:  Final result Specimen:  Blood from Hand, Left Updated:  03/16/19 2036     Lipase 131 u/L     CBC and differential [[id number]]  (Abnormal) Collected:  03/16/19 1934    Lab Status:  Final result Specimen:  Blood from Hand, Right Updated:  03/16/19 1941     WBC 8 90 Thousand/uL      RBC 6 41 Million/uL      Hemoglobin 19 0 g/dL      Hematocrit 57 4 %      MCV 90 fL      MCH 29 7 pg      MCHC 33 2 g/dL      RDW 14 3 %      MPV 8 1 fL      Platelets 792 Thousands/uL      Neutrophils Relative 89 %      Lymphocytes Relative 5 %      Monocytes Relative 5 %      Eosinophils Relative 0 %      Basophils Relative 0 %      Neutrophils Absolute 8 00 Thousands/µL      Lymphocytes Absolute 0 50 Thousands/µL      Monocytes Absolute 0 50 Thousand/µL      Eosinophils Absolute 0 00 Thousand/µL      Basophils Absolute 0 00 Thousands/µL     Urine Microscopic [[id number]]  (Abnormal) Collected:  03/16/19 1846    Lab Status:  Final result Specimen:  Urine, Other Updated:  03/16/19 1903     RBC, UA 1-2 /hpf      WBC, UA 1-2 /hpf      Epithelial Cells Occasional /hpf      Bacteria, UA Occasional /hpf      MUCUS THREADS Innumerable    UA w Reflex to Microscopic w Reflex to Culture [[id number]]  (Abnormal) Collected:  03/16/19 1846    Lab Status:  Final result Specimen:  Urine, Other Updated:  03/16/19 1856     Color, UA Deb     Clarity, UA Clear     Specific [place], UA 1 030     pH, UA 5 0     Leukocytes, UA Negative     Nitrite, UA Negative     Protein, UA Negative mg/dl      Glucose, UA Negative mg/dl      Ketones, UA Negative mg/dl      Bilirubin, UA 1 mg/dL     Blood, UA 50 0     UROBILINOGEN UA Negative mg/dL     Rapid Flu-Viral RNA amplification [facility] HEART ONLY) [[id number]]  (Normal) Collected:  03/16/19 1829    Lab Status:  Final result Specimen:  Nares from Nose Updated:  03/16/19 1853     INFLUENZA A AMPLIFIED RNA Not Detected     INFLUENZA B AMPLIFIED Not Detected                 CT abdomen pelvis w contrast   Final Result by Cathy Franklin MD (03/17 0015)      Appendix is fluid-filled with questionable periappendiceal fat stranding and appendiceal diameter at the upper limits of normal measuring 1 1 cm  Findings are concerning for but not diagnostic of acute appendicitis  Fatty liver  No colitis or enteritis               I personally discussed this study with Dylan Rosenthal on 3/17/2019 at 12:13 AM                      Workstation performed: [id number]                    Procedures  Procedures       Phone Contacts  ED Phone Contact    ED Course  ED Course as of Mar 18 1411   Sat Mar 16, 2019   7871 Patient vomited, will give IV and fluids and check basic labs        2055 Patient with continued abdominal pain, will CT      2246 Patient signed out to Joe Anders                                  MDM    Disposition  Final diagnoses:   Appendicitis, acute   Nausea and vomiting, intractability of vomiting not specified, unspecified vomiting type   Left lower quadrant pain   Right lower quadrant abdominal pain     Time reflects when diagnosis was documented in both MDM as applicable and the Disposition within this note     Time User Action Codes Description Comment    3/17/2019 12:54 AM Kavin Daly Add [K35 80] Appendicitis, acute     3/17/2019 12:55 AM Serafin Columbus Add [R11 2] Nausea and vomiting, intractability of vomiting not specified, unspecified vomiting type     3/17/2019 12:55 AM Serafin Columbus Add [R10 32] Left lower quadrant pain     3/17/2019 12:55 AM Serafin Raymundo Add [R10 31] Right lower quadrant abdominal pain       ED Disposition     ED Disposition Condition Date/Time Comment    Admit Fair Mary Kate Mar 17, 2019 12:55 AM Case was discussed with Dr Emerald Rodriguez and the patient's admission status was agreed to be Admission Status: inpatient status to the service of Dr Brianna Peralta    None         Discharge Medication List as of 3/17/2019  2:56 PM      START taking these medications    Details   !! oxyCODONE-acetaminophen (PERCOCET) 5-325 mg per tablet Take 1 tablet by mouth every 4 (four) hours as needed for severe pain for up to 10 daysMax Daily Amount: 6 tablets, Starting Sun 3/17/2019, Until Wed 3/27/2019, Print      !! oxyCODONE-acetaminophen (PERCOCET) 5-325 mg per tablet Take 1 tablet by mouth every 4 (four) hours as needed for severe pain for up to 10 daysMax Daily Amount: 6 tablets, Starting Sun 3/17/2019, Until Wed 3/27/2019, Print       !! - Potential duplicate medications found  Please discuss with provider        CONTINUE these medications which have NOT CHANGED    Details   emtricitabine-tenofovir (TRUVADA) 200-300 mg per tablet Take 1 tablet by mouth daily, Historical Med           Outpatient Discharge Orders   Discharge Diet     Activity as tolerated     Shower on day dressing removed (No bath)     No driving until     Call provider for:  redness, tenderness, or signs of infection (pain, swelling, redness, odor or green/yellow discharge around incision site)     Follow-up with surgeon in 1-2 weeks       ED Provider  Electronically Signed by           Jade Granados PA-C  03/18/19 0517

## 2019-03-17 NOTE — NURSING NOTE
Report received from RN in PACU  Patient back to room 509 drowsy but easily woken  States 3/10 abdominal pain  Denies chest pain or shortness of breath  3 stab sites to abdomen dry and intact  O2 sat 88% on RA, 2L O2 applied with sat up to 97%  Call bell within reach

## 2019-03-17 NOTE — NURSING NOTE
Patient arrived to unit at 0225 via w/c  Comfortable in bed  C/o 6/10 abdominal pain, but does not want anything for the pain at this time  Patient told he has something for pain and nausea if he needs it  NSS started at 125 ml/hr to 20 [facility]  Sinus tachy on the monitor  VSS  Afebrile at this time  Oriented to room  Call bell within reach and will continue to monitor

## 2019-03-17 NOTE — NURSING NOTE
PT was given D/C instruction and PT verbalized understanding of D/C instruction  All personal belonging was given to PT  IV was D/C   5 T staff accompany PT to lobby

## 2019-03-17 NOTE — OP NOTE
OPERATIVE REPORT  PATIENT NAME: Luis Vo    :  1985  MRN: [id number]  Pt Location:  OR ROOM 07    SURGERY DATE: 3/17/2019    Surgeon(s) and Role:     * Avinash Staley MD - Primary    Preop Diagnosis:  * No pre-op diagnosis entered * acute appendicitis    Postop diagnosis * No Diagnosis Codes entered *  acute appendicitis    Procedure(s) (LRB):  APPENDECTOMY LAPAROSCOPIC (N/A)    Specimen(s):  ID Type Source Tests Collected by Time Destination   1 : Appendix Tissue Appendix TISSUE EXAM Avinash Staley MD 3/17/2019 11:22 AM        Estimated Blood Loss:   Minimal    Drains:  * No LDAs found *    Anesthesia Type:   * No anesthesia type entered *    Operative Indications:  * No pre-op diagnosis entered * acute appendicitis      Operative Findings:      Complications:   None    Procedure and Technique:  The patient brought to operative room placed on the upper table supine position  Under adequate general endotracheal anesthesia the abdomen was shaved prepped and draped in usual sterile fashion  A small incision was made in the umbilicus with a 11  Scalpel blade  The Veress needle was inserted them was insufflated with CO2 to 15 mm of mercury  Needle was removed 10 mm port is inserted  The 10 mm angled scope was inserted and the abdomen was visually explored  There was noted be no trauma from needle port placement  Additional ports were placed left lower quadrant left mid quadrant  These were 12 and 5 respectively placed under direct vision  Patient was quite obese and the intra-abdominal compartment was somewhat constrained  Patient was placed in reverse Trendelenburg and rotated to the left  Small bowel was moved out of the way and the cecum was identified  The tenia were followed distal  and the base of the appendix was identified  After mobilizing this area the tip was identified also    This was grasped and pulled in the body appendix appeared to be adherent to the lateral pelvic sidewall  These adhesions were taken down with sharp dissection without difficulty  The appendix was obviously inflamed and tortuous  The base was grasped and a small window was made between the mesoappendix and the base of the appendix  Some fairly brisk backbleeding was and countered at this point as perhaps the appendiceal artery was violated  It was then grasped and held for short period of time and then cauterized  After controlling the bleeding and finishing the window a vascular stapling device was obtained  He was passed through the 12 mm port and then placed the at the base the appendix through the previously created window  Was closed and fired amputating the base of the appendix  Additional stapling device was obtained with the same load and was used to take the mesoappendix below the area of the previous bleeder  It was irrigated with saline solution was suctioned on the field  There was no bleeding noted at that time  Staple lines were intact  The appendix was then retrieved and brought out through the 12 mm port site  The area was copiously irrigated with saline solution this was suction from the field  One last look demonstrated hemostasis and staple lines intact  At that point all CO2 and ports were removed  Fascial defect at the umbilicus was closed with an 0 Vicryl suture  All ports were infiltrated with 0 5% Marcaine  The skin is closed with a 4 0 Monocryl in a subcuticular fashion  Benzoin Steri-Strips were applied    Estimated blood was minimal patient tolerated the procedure well he was delivered to the recovery room in stable condition   I was present for the entire procedure    Patient Disposition:  PACU     SIGNATURE: Isabella Youssef MD  DATE: March 17, 2019  TIME: 12:25 PM

## 2019-03-17 NOTE — UTILIZATION REVIEW
Notification of Inpatient Admission/Inpatient Authorization Request  This is a Notification of Inpatient Admission/Request for Inpatient Authorization for our facility [address] [place] [address]  Be advised that this patient was admitted to our facility under Inpatient Status  Please contact the Utilization Review Department where the patient is receiving care services for additional admission information  Place of Service Code: 24   Place of Service Name: Inpatient Hospital  Presentation Date & Time: 3/16/2019  5:49 PM  Inpatient Admission Date & Time: 3/17/19 0028  Discharge Date & Time: No discharge date for patient encounter  Discharge Disposition (if discharged): Final discharge disposition not confirmed  Attending Physician: Lory Ames Md [[id number]] [facility]  Specialty- General Surgery  Medicare Number- [id number]  Medicaid Number - [id number]  [facility] Number - [id number] ID- [id number]  Primary Office:  [address] [place] [address], [address]   Phone 1: [phone]  Fax: [phone]   Admission Orders (From admission, onward)    Ordered        03/17/19 0028  Inpatient Admission  Once     Order ID Start Status   [id number] 03/17/19 0028 Completed                Facility: [address] [place] [address] Utilization Review Department  Phone: [phone]; Fax [phone]  [email or web address]  ATTENTION: Please call with any questions or concerns to [phone]  and carefully listen to the prompts so that you are directed to the right person  Send all requests for admission clinical reviews, approved or denied determinations and any other requests to fax [phone]   All voicemails are confidential

## 2019-03-17 NOTE — UTILIZATION REVIEW
Initial Clinical Review    Admission: Date/Time/Statement: 3/17/19 @ 0028 Inpatient Written   Orders Placed This Encounter   Procedures    Inpatient Admission     Standing Status:   Standing     Number of Occurrences:   1     Order Specific Question:   Admitting Physician     Answer:   Donavon Carrillo     Order Specific Question:   Level of Care     Answer:   Med Surg [16]     Order Specific Question:   Estimated length of stay     Answer:   More than 2 Midnights     Order Specific Question:   Certification     Answer:   I certify that inpatient services are medically necessary for this patient for a duration of greater than two midnights  See H&P and MD Progress Notes for additional information about the patient's course of treatment  ED: Date/Time/Mode of Arrival:   ED Arrival Information     Expected Arrival Acuity Means of Arrival Escorted By Service Admission Type    - 3/16/2019 17:36 Urgent Walk-In Self Surgery-General Urgent    Arrival Complaint    flu like symptoms        Chief Complaint:   Chief Complaint   Patient presents with    Vomiting     "I have a really bad headache, I was vomiting and having diarrhea and my stomach hurts"   started at 10 this morning     Assessment/Plan:   History of Present Illness:  Patient is a 43-year-old  male who presents to the office with a 24 hour history of nausea vomiting and diarrhea  He developed diffuse abdominal pain which was originally periumbilical and became right lower quadrant  Presents to the emergency room at [facility] dehydrated and in pain  He was resuscitated in the CT scan the abdomen was obtained that demonstrated what appeared to be acute appendicitis with a dilated appendix and periappendiceal stranding  He is admitted at this time for definitive treatment  ADMIT INPATIENT STATUS:  OR for acute appendicitis, IV ABX    ED Vital Signs:   ED Triage Vitals   Temperature Pulse Respirations Blood Pressure SpO2   03/16/19 1747 03/16/19 1747 03/16/19 1747 03/16/19 1747 03/16/19 1747   (!) 100 6 °F (38 1 °C) (!) 123 16 (!) 174/81 100 %      Temp Source Heart Rate Source Patient Position - Orthostatic VS BP Location FiO2 (%)   03/16/19 1747 03/16/19 1747 03/16/19 1747 03/16/19 1747 --   Tympanic Monitor Sitting Left arm       Pain Score       03/16/19 1848       Worst Possible Pain        Wt Readings from Last 1 Encounters:   03/17/19 102 kg (224 lb 13 9 oz)     Vital Signs (abnormal): TEMP 102, -123  Pertinent Labs/Diagnostic Test Results: URINE COLOR AUGUSTINE, BILIRUBIN UA 1, BLOOD UA 50 0, RBC UA 1-2, WBC UA 1-2, MUCUS THREADS INNUMERABLE, RBC 6 41, HGB 19 0, HCT 57 4, ALT 66  CT AP:  Appendix is fluid-filled with questionable periappendiceal fat stranding and appendiceal diameter at the upper limits of normal measuring 1 1 cm  Findings are concerning for but not diagnostic of acute appendicitis  Fatty liver    No colitis or enteritis      ED Treatment:   Medication Administration from 03/16/2019 1735 to 03/17/2019 0225       Date/Time Order Dose Route Action     03/16/2019 1831 ondansetron (ZOFRAN-ODT) dispersible tablet 4 mg 4 mg Oral Given     03/16/2019 1848 dicyclomine (BENTYL) tablet 20 mg 20 mg Oral Given     03/16/2019 1848 ibuprofen (MOTRIN) tablet 600 mg 600 mg Oral Given     03/16/2019 1938 ondansetron (ZOFRAN) injection 4 mg 4 mg Intravenous Given     03/16/2019 1935 sodium chloride 0 9 % bolus 1,000 mL 1,000 mL Intravenous New Bag     03/16/2019 2104 metroNIDAZOLE (FLAGYL) IVPB (premix) 500 mg 500 mg Intravenous New Bag     03/16/2019 2106 sodium chloride 0 9 % bolus 1,000 mL 1,000 mL Intravenous New Bag     03/16/2019 2144 iohexol (OMNIPAQUE) 240 MG/ML solution 50 mL 50 mL Oral Given     03/16/2019 2307 levofloxacin (LEVAQUIN) IVPB (premix) 750 mg 750 mg Intravenous New Bag     03/16/2019 2337 iohexol (OMNIPAQUE) 350 MG/ML injection (SINGLE-DOSE) 100 mL 100 mL Intravenous Given     03/17/2019 0044 sodium chloride 0 9 % bolus 1,000 mL 1,000 mL Intravenous New Bag     03/17/2019 0044 acetaminophen (TYLENOL) tablet 975 mg 975 mg Oral Given        Past Medical/Surgical History: Active Ambulatory Problems     Diagnosis Date Noted    No Active Ambulatory Problems     Admitting Diagnosis: Vomiting [R11 10]  Appendicitis, acute [K35 80]  Left lower quadrant pain [R10 32]  Right lower quadrant abdominal pain [R10 31]  Nausea and vomiting, intractability of vomiting not specified, unspecified vomiting type [R11 2]  Age/Sex: 30 y o  male  Admission Orders:  Telemetry   NPO  OR for Lap Appendectomy  BR priv  Scheduled Meds:   Current Facility-Administered Medications:  diphenhydrAMINE 50 mg Intravenous Once   morphine injection 10 mg Intravenous Once   ondansetron 4 mg Intravenous Once PRN   sodium chloride 125 mL/hr Intravenous Continuous     Continuous Infusions:   sodium chloride 125 mL/hr Last Rate: 125 mL/hr (03/17/19 0240)     PRN Meds: ondansetron    Network Utilization Review Department  Phone: [phone]; Fax [phone]  [email or web address]  ATTENTION: Please call with any questions or concerns to [phone]  and carefully listen to the prompts so that you are directed to the right person  Send all requests for admission clinical reviews, approved or denied determinations and any other requests to fax [phone]   All voicemails are confidential

## 2019-03-17 NOTE — PLAN OF CARE
Problem: PAIN - ADULT  Goal: Verbalizes/displays adequate comfort level or baseline comfort level  Description  Interventions:  - Encourage patient to monitor pain and request assistance  - Assess pain using appropriate pain scale  - Administer analgesics based on type and severity of pain and evaluate response  - Implement non-pharmacological measures as appropriate and evaluate response  - Consider cultural and social influences on pain and pain management  - Notify physician/advanced practitioner if interventions unsuccessful or patient reports new pain  Outcome: Progressing     Problem: INFECTION - ADULT  Goal: Absence or prevention of progression during hospitalization  Description  INTERVENTIONS:  - Assess and monitor for signs and symptoms of infection  - Monitor lab/diagnostic results  - Monitor all insertion sites, i e  indwelling lines, tubes, and drains  - Monitor endotracheal (as able) and nasal secretions for changes in amount and color  - [place] appropriate cooling/warming therapies per order  - Administer medications as ordered  - Instruct and encourage patient and family to use good hand hygiene technique  - Identify and instruct in appropriate isolation precautions for identified infection/condition  Outcome: Progressing  Goal: Absence of fever/infection during neutropenic period  Description  INTERVENTIONS:  - Monitor WBC  - Implement neutropenic guidelines  Outcome: Progressing     Problem: SAFETY ADULT  Goal: Patient will remain free of falls  Description  INTERVENTIONS:  - Assess patient frequently for physical needs  -  Identify cognitive and physical deficits and behaviors that affect risk of falls    -  [place] fall precautions as indicated by assessment   - Educate patient/family on patient safety including physical limitations  - Instruct patient to call for assistance with activity based on assessment  - Modify environment to reduce risk of injury  - Consider OT/PT consult to assist with strengthening/mobility  Outcome: Progressing  Goal: Maintain or return to baseline ADL function  Description  INTERVENTIONS:  -  Assess patient's ability to carry out ADLs; assess patient's baseline for ADL function and identify physical deficits which impact ability to perform ADLs (bathing, care of mouth/teeth, toileting, grooming, dressing, etc )  - Assess/evaluate cause of self-care deficits   - Assess range of motion  - Assess patient's mobility; develop plan if impaired  - Assess patient's need for assistive devices and provide as appropriate  - Encourage maximum independence but intervene and supervise when necessary  ¯ Involve family in performance of ADLs  ¯ Assess for home care needs following discharge   ¯ Request OT consult to assist with ADL evaluation and planning for discharge  ¯ Provide patient education as appropriate  Outcome: Progressing  Goal: Maintain or return mobility status to optimal level  Description  INTERVENTIONS:  - Assess patient's baseline mobility status (ambulation, transfers, stairs, etc )    - Identify cognitive and physical deficits and behaviors that affect mobility  - Identify mobility aids required to assist with transfers and/or ambulation (gait belt, sit-to-stand, lift, walker, cane, etc )  - [place] fall precautions as indicated by assessment  - Record patient progress and toleration of activity level on Mobility SBAR; progress patient to next Phase/Stage  - Instruct patient to call for assistance with activity based on assessment  - Request Rehabilitation consult to assist with strengthening/weightbearing, etc   Outcome: Progressing     Problem: DISCHARGE PLANNING  Goal: Discharge to home or other facility with appropriate resources  Description  INTERVENTIONS:  - Identify barriers to discharge w/patient and caregiver  - Arrange for needed discharge resources and transportation as appropriate  - Identify discharge learning needs (meds, wound care, etc )  - Arrange for interpretive services to assist at discharge as needed  - Refer to Case Management Department for coordinating discharge planning if the patient needs post-hospital services based on physician/advanced practitioner order or complex needs related to functional status, cognitive ability, or social support system  Outcome: Progressing     Problem: Knowledge Deficit  Goal: Patient/family/caregiver demonstrates understanding of disease process, treatment plan, medications, and discharge instructions  Description  Complete learning assessment and assess knowledge base    Interventions:  - Provide teaching at level of understanding  - Provide teaching via preferred learning methods  Outcome: Progressing

## 2019-03-18 ENCOUNTER — TELEPHONE (OUTPATIENT)
Dept: SURGERY | Facility: CLINIC | Age: 34
End: 2019-03-18

## 2019-03-18 NOTE — UTILIZATION REVIEW
Notification of Discharge  This is a Notification of Discharge from our facility [address]  Please be advised that this patient has been discharge from our facility  Below you will find the admission and discharge date and time including the patients disposition  PRESENTATION DATE: 3/16/2019  5:49 PM  IP ADMISSION DATE: 3/17/19 0028  DISCHARGE DATE: 3/17/2019  5:00 PM  DISPOSITION: [facility] Utilization Review Department  Phone: [phone]; Fax [phone]  [email or web address]  ATTENTION: Please call with any questions or concerns to [phone]  and carefully listen to the prompts so that you are directed to the right person  Send all requests for admission clinical reviews, approved or denied determinations and any other requests to fax [phone]   All voicemails are confidential

## 2019-03-20 ENCOUNTER — TELEPHONE (OUTPATIENT)
Dept: SURGERY | Facility: CLINIC | Age: 34
End: 2019-03-20

## 2019-03-20 NOTE — UTILIZATION REVIEW
Initial Clinical Review    Admission: Date/Time/Statement: 3/17/19 @ 0028 Inpatient Written   Orders Placed This Encounter   Procedures    Inpatient Admission     Standing Status:   Standing     Number of Occurrences:   1     Order Specific Question:   Admitting Physician     Answer:   Rudean Snellen     Order Specific Question:   Level of Care     Answer:   Med Surg [16]     Order Specific Question:   Estimated length of stay     Answer:   More than 2 Midnights     Order Specific Question:   Certification     Answer:   I certify that inpatient services are medically necessary for this patient for a duration of greater than two midnights  See H&P and MD Progress Notes for additional information about the patient's course of treatment  ED: Date/Time/Mode of Arrival:   ED Arrival Information     Expected Arrival Acuity Means of Arrival Escorted By Service Admission Type    - 3/16/2019 17:36 Urgent Walk-In Self Surgery-General Urgent    Arrival Complaint    flu like symptoms        Chief Complaint:   Chief Complaint   Patient presents with    Vomiting     "I have a really bad headache, I was vomiting and having diarrhea and my stomach hurts"   started at 10 this morning     Assessment/Plan:   History of Present Illness:  Patient is a 53-year-old  male who presents to the office with a 24 hour history of nausea vomiting and diarrhea  He developed diffuse abdominal pain which was originally periumbilical and became right lower quadrant  Presents to the emergency room at [facility] dehydrated and in pain  He was resuscitated in the CT scan the abdomen was obtained that demonstrated what appeared to be acute appendicitis with a dilated appendix and periappendiceal stranding  He is admitted at this time for definitive treatment  ADMIT INPATIENT STATUS:  OR for acute appendicitis, IV ABX    ED Vital Signs:   ED Triage Vitals   Temperature Pulse Respirations Blood Pressure SpO2   03/16/19 1747 03/16/19 1747 03/16/19 1747 03/16/19 1747 03/16/19 1747   (!) 100 6 °F (38 1 °C) (!) 123 16 (!) 174/81 100 %      Temp Source Heart Rate Source Patient Position - Orthostatic VS BP Location FiO2 (%)   03/16/19 1747 03/16/19 1747 03/16/19 1747 03/16/19 1747 --   Tympanic Monitor Sitting Left arm       Pain Score       03/16/19 1848       Worst Possible Pain          Wt Readings from Last 1 Encounters:   03/17/19 102 kg (224 lb 13 9 oz)     Vital Signs (abnormal): TEMP 102, -123  Pertinent Labs/Diagnostic Test Results: URINE COLOR AUGUSTINE, BILIRUBIN UA 1, BLOOD UA 50 0, RBC UA 1-2, WBC UA 1-2, MUCUS THREADS INNUMERABLE, RBC 6 41, HGB 19 0, HCT 57 4, ALT 66  CT AP:  Appendix is fluid-filled with questionable periappendiceal fat stranding and appendiceal diameter at the upper limits of normal measuring 1 1 cm  Findings are concerning for but not diagnostic of acute appendicitis  Fatty liver    No colitis or enteritis      ED Treatment:   Medication Administration from 03/16/2019 1735 to 03/17/2019 0225       Date/Time Order Dose Route Action     03/16/2019 1831 ondansetron (ZOFRAN-ODT) dispersible tablet 4 mg 4 mg Oral Given     03/16/2019 1848 dicyclomine (BENTYL) tablet 20 mg 20 mg Oral Given     03/16/2019 1848 ibuprofen (MOTRIN) tablet 600 mg 600 mg Oral Given     03/16/2019 1938 ondansetron (ZOFRAN) injection 4 mg 4 mg Intravenous Given     03/16/2019 1935 sodium chloride 0 9 % bolus 1,000 mL 1,000 mL Intravenous New Bag     03/16/2019 2104 metroNIDAZOLE (FLAGYL) IVPB (premix) 500 mg 500 mg Intravenous New Bag     03/16/2019 2106 sodium chloride 0 9 % bolus 1,000 mL 1,000 mL Intravenous New Bag     03/16/2019 2144 iohexol (OMNIPAQUE) 240 MG/ML solution 50 mL 50 mL Oral Given     03/16/2019 2307 levofloxacin (LEVAQUIN) IVPB (premix) 750 mg 750 mg Intravenous New Bag     03/16/2019 2337 iohexol (OMNIPAQUE) 350 MG/ML injection (SINGLE-DOSE) 100 mL 100 mL Intravenous Given     03/17/2019 0044 sodium chloride 0 9 % bolus 1,000 mL 1,000 mL Intravenous New Bag     03/17/2019 0044 acetaminophen (TYLENOL) tablet 975 mg 975 mg Oral Given        Past Medical/Surgical History: Active Ambulatory Problems     Diagnosis Date Noted    No Active Ambulatory Problems     Admitting Diagnosis: Vomiting [R11 10]  Appendicitis, acute [K35 80]  Left lower quadrant pain [R10 32]  Right lower quadrant abdominal pain [R10 31]  Nausea and vomiting, intractability of vomiting not specified, unspecified vomiting type [R11 2]  Age/Sex: 30 y o  male  Admission Orders:  Telemetry   NPO  OR for Lap Appendectomy  BR priv  Scheduled Meds:   Current Facility-Administered Medications:  diphenhydrAMINE 50 mg Intravenous Once   morphine injection 10 mg Intravenous Once   ondansetron 4 mg Intravenous Once PRN   sodium chloride 125 mL/hr Intravenous Continuous     Continuous Infusions:   No current facility-administered medications for this encounter  PRN Meds:     Network Utilization Review Department  Phone: [phone]; Fax [phone]  [email or web address]  ATTENTION: Please call with any questions or concerns to [phone]  and carefully listen to the prompts so that you are directed to the right person  Send all requests for admission clinical reviews, approved or denied determinations and any other requests to fax [phone]   All voicemails are confidential

## 2019-03-20 NOTE — TELEPHONE ENCOUNTER
No answer when I called, I left a message on answering machine  I asked how me was and instructed him to call the office with any concerns  Also, I stated that his return appt   Was April 2@ 3 pm

## 2019-03-26 NOTE — UTILIZATION REVIEW
Notification of Discharge  This is a Notification of Discharge from our facility [address]  Please be advised that this patient has been discharge from our facility  Below you will find the admission and discharge date and time including the patients disposition  PRESENTATION DATE: 3/16/2019  5:49 PM  IP ADMISSION DATE: N/A N/A  DISCHARGE DATE: 3/17/2019  5:00 PM  DISPOSITION: [address] Utilization Review Department  Phone: [phone]; Fax [phone]  [email or web address]  ATTENTION: Please call with any questions or concerns to [phone]  and carefully listen to the prompts so that you are directed to the right person  Send all requests for admission clinical reviews, approved or denied determinations and any other requests to fax [phone]   All voicemails are confidential

## 2019-04-02 ENCOUNTER — OFFICE VISIT (OUTPATIENT)
Dept: SURGERY | Facility: CLINIC | Age: 34
End: 2019-04-02

## 2019-04-02 VITALS
SYSTOLIC BLOOD PRESSURE: 114 MMHG | BODY MASS INDEX: 36 KG/M2 | WEIGHT: 224 LBS | DIASTOLIC BLOOD PRESSURE: 78 MMHG | RESPIRATION RATE: 16 BRPM | HEART RATE: 92 BPM | HEIGHT: 66 IN

## 2019-04-02 DIAGNOSIS — K35.30 ACUTE APPENDICITIS WITH LOCALIZED PERITONITIS, WITHOUT PERFORATION, ABSCESS, OR GANGRENE: Primary | ICD-10-CM

## 2019-04-02 PROCEDURE — 99024 POSTOP FOLLOW-UP VISIT: CPT | Performed by: SPECIALIST

## 2019-04-02 RX ORDER — CEPHALEXIN 500 MG/1
CAPSULE ORAL
Refills: 0 | COMMUNITY
Start: 2019-02-25 | End: 2019-04-16

## (undated) DEVICE — HYGIENE-FILTER  FOR SINGLE USE

## (undated) DEVICE — SUT MONOCRYL 4-0 PS-2 27 IN Y426H

## (undated) DEVICE — ENDOPATH ECHELON ENDOSCOPIC LINEAR CUTTER RELOADS, WHITE, 60MM: Brand: ECHELON ENDOPATH

## (undated) DEVICE — SYRINGE 30ML LL

## (undated) DEVICE — GLOVE SURG DERMASSURE LF 6.5

## (undated) DEVICE — SWABSTCK, BENZOIN TINCTURE, 1/PK, STRL: Brand: APLICARE

## (undated) DEVICE — STERILE POLYISOPRENE POWDER-FREE SURGICAL GLOVES WITH EMOLLIENT COATING: Brand: PROTEXIS

## (undated) DEVICE — ENDOPATH 5MM CURVED SCISSORS WITH MONOPOLAR CAUTERY: Brand: ENDOPATH

## (undated) DEVICE — ECHELON FLEX POWERED PLUS ARTICULATING ENDOSCOPIC LINEAR CUTTER , 60MM: Brand: ECHELON FLEX

## (undated) DEVICE — CHLORAPREP HI-LITE 26ML ORANGE

## (undated) DEVICE — SUT VICRYL 0 UR-6 27 IN J603H

## (undated) DEVICE — IRRIG ENDO FLO TUBING

## (undated) DEVICE — STERILE POLYISOPRENE POWDER-FREE SURGICAL GLOVES: Brand: PROTEXIS

## (undated) DEVICE — SURGICAL CLIPPER BLADE GENERAL USE

## (undated) DEVICE — INTENDED FOR TISSUE SEPARATION, AND OTHER PROCEDURES THAT REQUIRE A SHARP SURGICAL BLADE TO PUNCTURE OR CUT.: Brand: BARD-PARKER SAFETY BLADES SIZE 11, STERILE

## (undated) DEVICE — ALLENTOWN LAP CHOLE APP PACK: Brand: CARDINAL HEALTH

## (undated) DEVICE — NEEDLE BLUNT 18 G X 1 1/2IN

## (undated) DEVICE — LAMINECTOMY ARM CRADLE FOAM POSITIONER: Brand: CARDINAL HEALTH

## (undated) DEVICE — TROCAR: Brand: KII FIOS FIRST ENTRY

## (undated) DEVICE — 3M™ STERI-STRIP™ REINFORCED ADHESIVE SKIN CLOSURES, R1547, 1/2 IN X 4 IN (12 MM X 100 MM), 6 STRIPS/ENVELOPE: Brand: 3M™ STERI-STRIP™

## (undated) DEVICE — 3L THIN WALL CAN: Brand: CRD

## (undated) DEVICE — INSUFFLATION NEEDLE TO ESTABLISH PNEUMOPERITONEUM.: Brand: INSUFFLATION NEEDLE

## (undated) DEVICE — CORD MONOPOLAR STERILE DISPOSABLE

## (undated) DEVICE — PAD GROUNDING ADULT